# Patient Record
Sex: MALE | Race: WHITE
[De-identification: names, ages, dates, MRNs, and addresses within clinical notes are randomized per-mention and may not be internally consistent; named-entity substitution may affect disease eponyms.]

---

## 2017-01-12 ENCOUNTER — HOSPITAL ENCOUNTER (OUTPATIENT)
Dept: HOSPITAL 45 - C.CTS | Age: 37
Discharge: HOME | End: 2017-01-12
Attending: INTERNAL MEDICINE
Payer: COMMERCIAL

## 2017-01-12 DIAGNOSIS — K76.9: ICD-10-CM

## 2017-01-12 DIAGNOSIS — R10.12: Primary | ICD-10-CM

## 2017-01-12 NOTE — DIAGNOSTIC IMAGING REPORT
CT OF THE ABDOMEN WITH IV AND ORAL CONTRAST



CT DOSE: 688.67 mGycm



CLINICAL HISTORY: Acute left upper quadrant pain.    



TECHNIQUE: Axial images of the abdomen were obtained following intravenous

injection of 94 cc Optiray 320 IV. Oral contrast was administered.



COMPARISON STUDY:  None.



FINDINGS: Visualized portions of the lower chest demonstrate linear and

groundglass opacities suggestive of atelectasis. There is no pneumatosis, free

air or portal venous gas within the abdomen. Liver morphology is normal. Note is

made of a 1.3 cm hypodense segment 7 hepatic lesion shown on axial image 2472.

The size of the spleen is at the upper limits of normal. There is no perisplenic

fluid. There is no splenic lesion. The adrenal glands, kidneys and pancreas are

normal. There is no biliary or pancreatic ductal dilatation. No abdominal

lymphadenopathy or ascites is identified. The caliber and wall thickness of

visualized small and large bowel are normal. Skeletal structures are

unremarkable.



IMPRESSION:  



1. No acute process within the abdomen.



2.  No significant abnormality of the spleen. Size of the spleen at the upper

limits of normal. No perisplenic fluid.



3. Indeterminate 1.3 cm right hepatic lobe lesion. Although indeterminate, this

is likely benign. A follow-up right upper quadrant ultrasound could be obtained.







Electronically signed by:  Rajinder Echavarria M.D.

1/12/2017 3:57 PM



Dictated Date/Time:  1/12/2017 3:50 PM

## 2017-04-27 ENCOUNTER — HOSPITAL ENCOUNTER (OUTPATIENT)
Dept: HOSPITAL 45 - C.ULTR | Age: 37
Discharge: HOME | End: 2017-04-27
Attending: INTERNAL MEDICINE
Payer: COMMERCIAL

## 2017-04-27 DIAGNOSIS — K76.9: Primary | ICD-10-CM

## 2017-04-27 NOTE — DIAGNOSTIC IMAGING REPORT
ABDOMINAL ULTRASOUND, RIGHT UPPER QUADRANT



HISTORY:      Evaluate liver lesion on CT..



COMPARISON:  Abdomen and pelvis CT 1/12/2017.



FINDINGS:



Pancreas: The pancreatic tail is obscured by overlying bowel gas. The remaining

portions of the pancreas are within normal limits.



Liver: Unremarkable.



Gallbladder: No gallbladder wall thickening. No gallstones.



CBD: 4 mm.



Right kidney: No hydronephrosis.



IMPRESSION: 



1. No significant abnormality within the abdomen.

2. Specifically, the right hepatic lobe lesion seen on CT was not identified on

this examination. This was likely obscured by the overlying lung parenchyma

given the close proximity to the diaphragm.







Electronically signed by:  Albert Avendano M.D.

4/27/2017 9:47 AM



Dictated Date/Time:  4/27/2017 9:45 AM

## 2017-06-06 ENCOUNTER — HOSPITAL ENCOUNTER (OUTPATIENT)
Dept: HOSPITAL 45 - C.LABBFT | Age: 37
Discharge: HOME | End: 2017-06-06
Attending: INTERNAL MEDICINE
Payer: COMMERCIAL

## 2017-06-06 DIAGNOSIS — Z00.00: Primary | ICD-10-CM

## 2017-06-06 DIAGNOSIS — R07.9: ICD-10-CM

## 2017-06-06 DIAGNOSIS — K76.9: ICD-10-CM

## 2017-06-06 DIAGNOSIS — R00.2: ICD-10-CM

## 2017-06-06 LAB
ALBUMIN/GLOB SERPL: 1.3 {RATIO} (ref 0.9–2)
ALP SERPL-CCNC: 63 U/L (ref 45–117)
ALT SERPL-CCNC: 28 U/L (ref 12–78)
ANION GAP SERPL CALC-SCNC: 8 MMOL/L (ref 3–11)
AST SERPL-CCNC: 17 U/L (ref 15–37)
BASOPHILS # BLD: 0.02 K/UL (ref 0–0.2)
BASOPHILS NFR BLD: 0.2 %
BUN SERPL-MCNC: 21 MG/DL (ref 7–18)
BUN/CREAT SERPL: 23 (ref 10–20)
CALCIUM SERPL-MCNC: 9 MG/DL (ref 8.5–10.1)
CHLORIDE SERPL-SCNC: 106 MMOL/L (ref 98–107)
CHOLEST/HDLC SERPL: 5.4 {RATIO}
CO2 SERPL-SCNC: 26 MMOL/L (ref 21–32)
COMPLETE: YES
CREAT SERPL-MCNC: 0.92 MG/DL (ref 0.6–1.4)
EOSINOPHIL NFR BLD AUTO: 228 K/UL (ref 130–400)
GLOBULIN SER-MCNC: 3.2 GM/DL (ref 2.5–4)
GLUCOSE SERPL-MCNC: 91 MG/DL (ref 70–99)
GLUCOSE UR QL: 37 MG/DL
HCT VFR BLD CALC: 43.3 % (ref 42–52)
IG%: 0.1 %
IMM GRANULOCYTES NFR BLD AUTO: 25.3 %
KETONES UR QL STRIP: 132 MG/DL
LYMPHOCYTES # BLD: 2.1 K/UL (ref 1.2–3.4)
MAGNESIUM SERPL-MCNC: 2.2 MG/DL (ref 1.8–2.4)
MCH RBC QN AUTO: 31 PG (ref 25–34)
MCHC RBC AUTO-ENTMCNC: 34.2 G/DL (ref 32–36)
MCV RBC AUTO: 90.8 FL (ref 80–100)
MONOCYTES NFR BLD: 10 %
NEUTROPHILS # BLD AUTO: 2.4 %
NEUTROPHILS NFR BLD AUTO: 62 %
NITRITE UR QL STRIP: 157 MG/DL (ref 0–150)
PH UR: 200 MG/DL (ref 0–200)
PMV BLD AUTO: 9.5 FL (ref 7.4–10.4)
POTASSIUM SERPL-SCNC: 4.3 MMOL/L (ref 3.5–5.1)
RBC # BLD AUTO: 4.77 M/UL (ref 4.7–6.1)
SODIUM SERPL-SCNC: 140 MMOL/L (ref 136–145)
TSH SERPL-ACNC: 1.22 UIU/ML (ref 0.3–4.5)
VERY LOW DENSITY LIPOPROT CALC: 31 MG/DL
WBC # BLD AUTO: 8.3 K/UL (ref 4.8–10.8)

## 2017-07-03 ENCOUNTER — HOSPITAL ENCOUNTER (EMERGENCY)
Dept: HOSPITAL 45 - C.EDB | Age: 37
Discharge: HOME | End: 2017-07-03
Payer: COMMERCIAL

## 2017-07-03 VITALS — DIASTOLIC BLOOD PRESSURE: 65 MMHG | HEART RATE: 72 BPM | OXYGEN SATURATION: 94 % | SYSTOLIC BLOOD PRESSURE: 128 MMHG

## 2017-07-03 VITALS
HEIGHT: 70.98 IN | BODY MASS INDEX: 35.19 KG/M2 | BODY MASS INDEX: 35.19 KG/M2 | WEIGHT: 251.33 LBS | WEIGHT: 251.33 LBS | HEIGHT: 70.98 IN

## 2017-07-03 VITALS — TEMPERATURE: 97.88 F

## 2017-07-03 DIAGNOSIS — M51.16: Primary | ICD-10-CM

## 2017-07-03 DIAGNOSIS — I10: ICD-10-CM

## 2017-07-03 DIAGNOSIS — E78.5: ICD-10-CM

## 2017-07-03 DIAGNOSIS — M21.372: ICD-10-CM

## 2017-07-03 NOTE — EMERGENCY ROOM VISIT NOTE
ED Visit Note


First contact with patient:  12:25


CHIEF COMPLAINT:  Low back pain radiating to the legs x 2.5 weeks





HISTORY OF PRESENT ILLNESS: Patient is a generally healthy 46-year-old white 

male who presents emergency department for evaluation of low back pain 

radiating into the legs.  He says symptoms for about 2-1/2 weeks.  They started 

after he was bent over pain limiting sweet corn.  He describes pain primarily 

across his left low back, radiating into his buttocks.  It has been radiating 

into both the right and the left legs, or into the medial thighs, although only 

radiates into one leg at a time.  He states that it initially was the right leg 

and more recently has been the left leg.  The pain has progressively worsened, 

to the point where he has been ambulating with a walker, and reports weakness 

in his legs.  Pain is worse with movement, to particularly with flexion.  He 

has been using over-the-counter anti-inflammatories, as well as a Medrol 

Dosepak and Mobic which were prescribed by his PCP.  He is previously 

established with [facility], his last MRI was in 2013.  He had a 

left lower extremity trauma many years ago and has a chronic foot drop and 

numbness for which he wears a brace, and denies any changes in this.  He denies 

any bowel or bladder incontinence or saddle anesthesias.  No recent direct 

trauma.  No vomiting or abdominal pain.





REVIEW OF SYSTEMS: Review of systems as per HPI.  All other systems reviewed 

were negative.  10 systems reviewed.


 


PMH: Electronic medical records are reviewed and summarized as above/below.  

See Problem List.





SOCIAL HISTORY:  Patient lives at home with his wife and son.  Nonsmoker, 

drinker, socially.





PHYSICAL EXAM: Vital Signs: Reviewed Nurse's notes.  


CONSTITUTIONAL: Patient is a 36-year-old white male who is awake and alert and 

sitting upright on the gurney in moderate distress due to his back pain.  There 

is significant discomfort with position changes.


NECK: No bruits auscultated.  Supple without lymphadenopathy.  No thyromegaly.  

No meningeal signs.  Full active range of motion without discomfort.


CARDIOVASCULAR: Regular rate and rhythm, with normal S1 and S2, no murmur or 

gallop or rub is heard.  No carotid bruits auscultated.  No JVD.  Peripheral 

pulses easily palpable.


RESPIRATORY: Breath sounds equal and clear to auscultation without wheezes, 

rales, or rhonchi heard.   Full and equal chest expansion without accessory 

muscle use or retractions. 


ABDOMEN: Bowel sounds are present.  Abdomen is soft, nontender and nondistended.


INTEGUMENTARY: No lesions or rash, normal skin turgor. 


LYMPH: No lymphadenopathy. 


SPINE: Examination of the patient's back does not demonstrate any ecchymosis, 

abrasions or outward signs of trauma.  No erythema, increased warmth or 

induration.  Patient has midline discomfort to palpation over the low lumbar 

spine, primarily on the left.  There is no pain over the SI joint or the 

sciatic notch.  He has increased pain with range of motion including flexion.  


EXTREMITIES: Leg lengths are symmetrical.  Negative logroll bilaterally.  

Patient has a chronic left foot drop and is unable to dorsiflex or plantar flex 

fully with the left.  Normal strength including dorsi-flexion and plantar 

flexion of the right great toe and ankle.  Normal strength to flexion and 

extension of the knees and flexion of the hips bilaterally.  Positive bilateral 

straight leg raise testing.  Lower extremity DTRs are equal and symmetrical 

bilaterally.  Distal pulses are easily palpable.  Sensation light touch is 

intact over the lower extremities bilaterally.





EMERGENCY DEPARTMENT COURSE: The patient was seen and examined as above.  His 

old MRI was reviewed.  IV lock was initiated and he was medicated with Toradol, 

Decadron, Dilaudid and Zofran for pain.  Lumbar spine MRI was obtained with the 

findings noted below.  The patient did report some nausea when he returned from 

MRI was given an additional Zofran 4 mg IV.  MRI findings were reviewed with 

Dr. Solis, and discussed with the patient and his wife at length.  Treatment 

options were discussed with the patient.  He would prefer to go home, which I 

feel is reasonable at this time, as long as his pain can be managed.  He 

certainly does not have any physical exam findings consistent with acute cord 

compression or cauda equina syndrome at this time.  Dr. Solis reports that his 

office will get in touch with the patient to set up an appointment for later 

this week.  The patient will be discharged home on oxycodone, Zofran and 

penicillin.  He was educated on the worrisome signs or symptoms for which she 

would return to the emergency department.  He was discharged home with his wife 

driving.





LUMBAR SPINE W/O CONTRAST





HISTORY: 36-year-old male presents with radicular low back pain with left leg


weakness for approximately 2 weeks.





COMPARISON: MRI lumbar spine 4/26/2013, lumbar spine radiographs 4/22/2013.





TECHNIQUE: Multiplanar multisequence MRI of the lumbar spine was obtained


without contrast.





FINDINGS: Vertebral body heights are well-maintained without compression


deformity. No focal bone marrow or soft tissue edema. Mild disc desiccation is


present at L4-L5 with mild intervertebral disc space narrowing. Conus medullaris


terminates at the T12-L1 level. Signal within the cord is unremarkable.





T12-L1: Unremarkable on the sagittal imaging alone. 





L1-L2: Annular tear with central disc extrusion is present centrally extending 5


mm caudal to the superior endplate of L2. This measures 5 mm in AP dimension and


causes moderate to severe central canal and severe left lateral recess


narrowing. The bilateral neuroforamen are generally patent.





L2-L3: Right lateral recess/foraminal disc protrusion causes mild central canal,


moderate right lateral recess and mild right neuroforaminal narrowing. The left


neuroforamen is generally patent.





L3-L4: Mild ligamentum flavum redundancy and broad-based posterior disc bulge


without significant central canal or neuroforaminal narrowing.





L4-L5: Annular tear with mild intervertebral disc space narrowing and disc


desiccation. Broad-based posterior disc bulge favoring the left lateral recess


and left neuroforamen is present in conjunction with mild facet arthropathy


causing mild left neuroforaminal stenosis. Central canal and right neuroforamen


are generally patent.





L5-S1: Mild facet arthropathy and ligamentum flavum redundancy causes mild left


neuroforaminal stenosis. The central canal and right neuroforamen are generally


patent.








IMPRESSION: 


1. Annular tear with central disc extrusion extending 5 mm caudally at L1-L2


causes moderate to severe central canal and severe left lateral recess


narrowing. This likely accounts for the patient's reported left lower extremity


symptomatology.





2. At L2-L3 there is a right lateral recess/foraminal disc protrusion which


causes mild central canal, moderate right lateral recess and mild right


neuroforaminal narrowing.





3. Mild intervertebral disc space narrowing with annular tear at L4-L5.


Problem List


Medical Problems:


(1) Chest pain


Status: Resolved  





(2) Chest pain


Status: Resolved  





(3) Hyperlipidemia Nec/Nos


Status: Chronic  





(4) Hypertension


Status: Chronic  





(5) Lumbosacral Neuritis Nos


Status: Chronic  











Current/Historical Medications


Scheduled


Ibuprofen (Advil), 600 MG PO DAILY


Lansoprazole (Prevacid), 30 MG PO DAILY


Meloxicam (Meloxicam), 15 MG PO DAILY


Prednisone (Prednisone), 0 PO DAILY





Scheduled PRN


Ondasetron Odt (Zofran Odt), 4 MG SL Q6H PRN for Nausea or Vomiting


Oxycodone Ir (Roxicodone Ir), 1-2 TAB PO Q4H PRN for Severe Pain





Allergies


Uncoded Allergies:  


     N (Allergy, Unknown, 2/13/03)


     NKDA (Allergy, Unknown, 2/13/03)





Vital Signs











  Date Time  Temp Pulse Resp B/P (MAP) Pulse Ox O2 Delivery O2 Flow Rate FiO2


 


7/3/17 13:44  65 18 120/61 97 Room Air  


 


7/3/17 11:51 36.6 77 18  96 Room Air  











Medications Administered











 Medications


  (Trade)  Dose


 Ordered  Sig/Cristina


 Route  Start Time


 Stop Time Status Last Admin


Dose Admin


 


 Ketorolac


 Tromethamine


  (Toradol Inj)  30 mg  NOW  STAT


 IV  7/3/17 12:54


 7/3/17 12:56 DC 7/3/17 12:54


30 MG


 


 Dexamethasone


 Sodium Phosphate


  (Decadron Inj)  10 mg  NOW  ONCE


 IV  7/3/17 13:00


 7/3/17 13:01 DC 7/3/17 13:00


10 MG


 


 Hydromorphone HCl


  (Dilaudid Inj)  1 mg  NOW  STAT


 IV  7/3/17 12:54


 7/3/17 12:56 DC 7/3/17 12:54


1 MG


 


 Ondansetron HCl


  (Zofran Inj)  4 mg  NOW  STAT


 IV  7/3/17 12:54


 7/3/17 12:56 DC 7/3/17 12:54


4 MG


 


 Ondansetron HCl


  (Zofran Inj)  4 mg  NOW  STAT


 IV  7/3/17 15:54


 7/3/17 15:55 DC 7/3/17 16:31


4 MG











Departure Information


Impression





 Primary Impression:  


 Lumbar disc herniation with radiculopathy





Prescriptions





Oxycodone Ir (Roxicodone Ir) 5 Mg Tab


1-2 TAB PO Q4H Y for Severe Pain, #25 TAB


   For Initial Treatment


   Prov: Queenie James,PA         7/3/17 


Prednisone (Prednisone) 20 Mg Tab


0 PO DAILY, #18 TAB


   3 DAILY FOR 3 DAYS, THEN 2 DAILY FOR 3 DAYS, THEN 1 DAILY FOR 3 DAYS.


   Prov: Queenie James PA         7/3/17 


Ondasetron Odt (ZOFRAN ODT) 4 Mg Tab


4 MG SL Q6H Y for Nausea or Vomiting, #20 TAB


   Prov: Queenie James PA         7/3/17





Referrals


Francis Joyner M.D. (PCP)





Patient Instructions


My [facility]





Additional Instructions





DO NOT drive, drink alcohol, operate machinery, or perform dangerous activities 

today.  You were given medications in the ER that can affect your ability to 

safely function or operate a vehicle.





Prednisone 20mg:  Once daily as instructed until the prescription is finished.  

It is best to take this earlier in the day as some patients note occasional 

difficulty falling asleep when taken in the late evening.





Oxycodone (OxyIR) 5mg: Take 1-2 pills every four hours for breakthrough pain.  

Avoid alcohol, operating machinery or dangerous equipment, working on ladders 

or roofs, DRIVING, or situations where being under the influence may be 

dangerous.  It is recommended to use an over-the-counter stool softener such as 

Colace, 100mg twice daily while taking this medication to avoid constipation. 





Stop Ibuprofen/Mobic, etc.





Zofran(odansetron) tablets 4mg:  Take one and allow it to dissolve in your 

mouth every four to six hours as needed for nausea or vomiting. 





Acetaminophen(Tylenol) may be used for fever or pain.  Use 1000mg every six 

hours as needed.  Avoid using more than 3000mg in a 24 hour period.  This 

medication can be taken if you need to drive, work, or perform activities which 

may be dangerous when taking narcotic pain medication.





Rest and avoid heavy lifting until your symptoms resolve and then gradually 

return to full activity.  A good rule of thumb is if it hurts your back to 

perform a certain activity, then it should be avoided until you are healthy 

again.  





A heating pad, warm compresses, or a hot shower may help with tight muscles and 

can be done several times a day as needed.  





Continue current medications.





Return to the ER immediately for any numbness, tingling, severe pain, loss of 

control of your bowels or bladder, inability to walk, or as needed.





Follow up with [facility] as arranged.  They will contact you 

with an appointment.

## 2017-07-03 NOTE — DIAGNOSTIC IMAGING REPORT
LUMBAR SPINE W/O CONTRAST



HISTORY: 36-year-old male presents with radicular low back pain with left leg

weakness for approximately 2 weeks.



COMPARISON: MRI lumbar spine 4/26/2013, lumbar spine radiographs 4/22/2013.



TECHNIQUE: Multiplanar multisequence MRI of the lumbar spine was obtained

without contrast.



FINDINGS: Vertebral body heights are well-maintained without compression

deformity. No focal bone marrow or soft tissue edema. Mild disc desiccation is

present at L4-L5 with mild intervertebral disc space narrowing. Conus medullaris

terminates at the T12-L1 level. Signal within the cord is unremarkable.



T12-L1: Unremarkable on the sagittal imaging alone. 



L1-L2: Annular tear with central disc extrusion is present centrally extending 5

mm caudal to the superior endplate of L2. This measures 5 mm in AP dimension and

causes moderate to severe central canal and severe left lateral recess

narrowing. The bilateral neuroforamen are generally patent.



L2-L3: Right lateral recess/foraminal disc protrusion causes mild central canal,

moderate right lateral recess and mild right neuroforaminal narrowing. The left

neuroforamen is generally patent.



L3-L4: Mild ligamentum flavum redundancy and broad-based posterior disc bulge

without significant central canal or neuroforaminal narrowing.



L4-L5: Annular tear with mild intervertebral disc space narrowing and disc

desiccation. Broad-based posterior disc bulge favoring the left lateral recess

and left neuroforamen is present in conjunction with mild facet arthropathy

causing mild left neuroforaminal stenosis. Central canal and right neuroforamen

are generally patent.



L5-S1: Mild facet arthropathy and ligamentum flavum redundancy causes mild left

neuroforaminal stenosis. The central canal and right neuroforamen are generally

patent.





IMPRESSION: 

1. Annular tear with central disc extrusion extending 5 mm caudally at L1-L2

causes moderate to severe central canal and severe left lateral recess

narrowing. This likely accounts for the patient's reported left lower extremity

symptomatology.



2. At L2-L3 there is a right lateral recess/foraminal disc protrusion which

causes mild central canal, moderate right lateral recess and mild right

neuroforaminal narrowing.



3. Mild intervertebral disc space narrowing with annular tear at L4-L5.







Electronically signed by:  Karl Kramer

7/3/2017 2:49 PM



Dictated Date/Time:  7/3/2017 2:30 PM

## 2017-07-06 ENCOUNTER — HOSPITAL ENCOUNTER (OUTPATIENT)
Dept: HOSPITAL 45 - X.SURG | Age: 37
Discharge: HOME | End: 2017-07-06
Attending: PHYSICAL MEDICINE & REHABILITATION
Payer: COMMERCIAL

## 2017-07-06 VITALS
HEART RATE: 74 BPM | TEMPERATURE: 97.88 F | DIASTOLIC BLOOD PRESSURE: 94 MMHG | OXYGEN SATURATION: 99 % | SYSTOLIC BLOOD PRESSURE: 132 MMHG

## 2017-07-06 VITALS
BODY MASS INDEX: 35.07 KG/M2 | WEIGHT: 250.53 LBS | WEIGHT: 250.53 LBS | HEIGHT: 70.98 IN | HEIGHT: 70.98 IN | BODY MASS INDEX: 35.07 KG/M2

## 2017-07-06 DIAGNOSIS — M51.26: Primary | ICD-10-CM

## 2017-07-06 DIAGNOSIS — Z82.49: ICD-10-CM

## 2017-07-06 DIAGNOSIS — M54.10: ICD-10-CM

## 2017-07-06 NOTE — DISCHARGE INSTRUCTIONS
Discharge Instructions


Date of Service


Jul 6, 2017.





Visit


Reason for Visit:  Low Back Pain





Discharge


Discharge Diagnosis / Problem:  low back pain





Discharge Goals


Goal(s):  Decrease discomfort, Improve function





Activity Recommendations


Activity Limitations:  resume your previous activity





Anesthesia


.





Post Anesthesia Instructions:





If you have had General Anesthesia or IV Sedation:





*  Do not drive today.


*  Resume driving when surgeon permits.


*  Do not make important decisions or sign legal documents today.


*  Call surgeon for:





   1.  Temperature elevations greater than 101 degrees F.


   2.  Uncontrollable pain.


   3.  Excessive bleeding.


   4.  Persistent nausea and vomiting.


   5.  Medication intolerance (nausea, vomiting or rash).





*  For nausea and vomiting use only clear liquids such as: tea, soda, bouillon 

until nausea subsides, then gradually increase diet as tolerated.





*  If you have any concerns or questions, call your surgeon's office.  If 

physician is unavailable and it is an emergency, call 911 or go to the nearest 

emergency room.





.





Diet Recommendations


Recommended Home Diet:  resume previous diet





Procedures


Procedures Performed:  


LUMBAR EPIDURAL STEROID INJECTION.





Pending Studies


Studies pending at discharge:  no





Medical Emergencies








.


Who to Call and When:





Medical Emergencies:  If at any time you feel your situation is an emergency, 

please call 911 immediately.





.





Non-Emergent Contact


Non-Emergency issues call your:  Specialist





.


.








"Provider Documentation" section prepared by Lasha Yanes.








.

## 2017-07-13 NOTE — [facility] OPERATIVE REPORT
Operative Report


Date of Service


Jul 6, 2017.





Operative Report


DATE OF SURGERY:  07/06/17.


 


DATE OF OPERATION:  07/06/2017


 


PREOPERATIVE DIAGNOSIS:  L1-2 herniated nucleus pulposus with left


lower extremity radiculopathy.


 


POSTOPERATIVE DIAGNOSIS:  Same.


 


PROCEDURE:  Left paramedian L1-2 intralaminar epidural steroid injection


under fluoroscopic guidance.


 


SURGEON:  Dr. Lasha Yanes.  


 


INDICATIONS:  The patient is a 36-year-old white male who presents today for an 

epidural to


provide him with relief.


 


PHYSICAL EXAMINATION:


GENERAL:  Pleasant male seated comfortably in no apparent distress.


MUSCULOSKELETAL EXAMINATION:  Lumbar paraspinal muscles were palpated.  They


were nontender.  He had no sensitivity of the sciatic notch.  He had normal


lower extremity strength.  Negative seated straight leg raises and had no


difficulty with hip adduction.


 


CONSENT:  Verbal and written consent was obtained from the patient.  Risks


and benefits were reviewed.  Risks include but are not limited to epidural


abscess, epidural hematoma, allergic reaction, dural puncture.  The patient


wishes to proceed.


 


PROCEDURE:  The patient was taken back to the special procedures room of the


[facility] where he was maintained in a prone position. 


Backside was cleansed with Betadine x3 and a dry sterile dressing was


applied.  Fluoroscope was used to identify the L1-2 intralaminar space and


overlying skin on the right side was anesthetized with 4 mL of lidocaine 1%


with a 25 gauge 1.5-inch needle.  A 22-gauge 3-1/2 inch Tuohy needle was then


directed down towards the intralaminar space.  It was advanced under lateral


fluoroscopic guidance.  Loss of resistance was noted and Isovue-300 contrast 1 

mL was injected in which demonstrated epidural uptake


pattern which was confirmed with both AP and lateral views.  He then


underwent injection after negative aspiration of 40 mg of Depo-Medrol and 4


mL of preservative free sodium chloride.  Injection was well tolerated.


 


DISPOSITION:


1.  The patient is taken out into the discharge recovery area where he will


be discharged home once discharge criteria have been met.


2.  Follow up in the [facility] office in 2-4 weeks.


 


 


I attest to the content of the Intraoperative Record and any orders documented 

therein. Any exceptions are noted below.


I attest to the content of the Intraoperative Record and any orders documented 

therein.  Any exceptions are noted below.

## 2017-09-19 ENCOUNTER — HOSPITAL ENCOUNTER (OUTPATIENT)
Dept: HOSPITAL 45 - C.CTS | Age: 37
Discharge: HOME | End: 2017-09-19
Attending: INTERNAL MEDICINE
Payer: COMMERCIAL

## 2017-09-19 DIAGNOSIS — K76.9: Primary | ICD-10-CM

## 2017-09-19 NOTE — DIAGNOSTIC IMAGING REPORT
ABD WITH IV CONTRAST ONLY (CT)



CLINICAL HISTORY: 37 years-old Male presenting with liver lesions. 



TECHNIQUE: Multidetector CT of the abdomen was performed after the

administration of intravenous contrast. IV contrast: 93 mL of Optiray 320. A

dose lowering technique was used consistent with the principles of ALARA (as low

as reasonably achievable). 



COMPARISON: CT from 1/12/2017 and ultrasound from 4/27/2017.



CT DOSE (mGy.cm): The estimated cumulative dose is 529.05 mGy.cm.



FINDINGS:



 topogram: Unremarkable.



Lung bases: Lung bases clear. No pericardial or pleural effusion.



Liver: Normal morphology. The previously noted indeterminate liver lesion in

segment 7 is not identified on the current examination definitively. This could

be secondary to partial opacification of the lesion. Patent hepatic vasculature.



Biliary: No intrahepatic or extrahepatic biliary ductal dilatation. Normal

gallbladder.



Pancreas: Normal.



Spleen: Normal.



Adrenal glands: Normal.



Kidneys and ureters: Normal. No hydronephrosis.



Gastrointestinal tract: Normal. No bowel obstruction.



Peritoneal cavity: No free fluid or intraperitoneal gas.



Vasculature: Aorta and IVC patent and normal in caliber.



Lymph nodes: Numerous small mesenteric lymph nodes noted.



Abdominal wall: Normal.



Musculoskeletal: Normal.



IMPRESSION:

1.  Nonvisualization of the previously noted small liver lesion in the right

hepatic lobe. This could suggest partial opacification of the lesion. Given the

lack of progression since January, this is suggestive of a benign lesion,

possibly hemangioma. In the absence of known malignancy or hepatocellular risk

factors, no further follow-up is recommended.











Electronically signed by:  Fuad Covarrubias M.D.

9/19/2017 7:44 AM



Dictated Date/Time:  9/19/2017 7:39 AM

## 2017-11-16 ENCOUNTER — HOSPITAL ENCOUNTER (OUTPATIENT)
Dept: HOSPITAL 45 - X.SURG | Age: 37
Discharge: HOME | End: 2017-11-16
Attending: PHYSICAL MEDICINE & REHABILITATION
Payer: COMMERCIAL

## 2017-11-16 VITALS
HEIGHT: 70.98 IN | BODY MASS INDEX: 35.07 KG/M2 | WEIGHT: 250.53 LBS | HEIGHT: 70.98 IN | BODY MASS INDEX: 35.07 KG/M2 | WEIGHT: 250.53 LBS

## 2017-11-16 VITALS — OXYGEN SATURATION: 94 % | HEART RATE: 90 BPM | DIASTOLIC BLOOD PRESSURE: 85 MMHG | SYSTOLIC BLOOD PRESSURE: 133 MMHG

## 2017-11-16 VITALS — TEMPERATURE: 99.5 F

## 2017-11-16 DIAGNOSIS — M79.604: ICD-10-CM

## 2017-11-16 DIAGNOSIS — M51.16: Primary | ICD-10-CM

## 2017-11-16 NOTE — OPERATIVE REPORT
DATE OF OPERATION:  11/16/2017

 

PREOPERATIVE DIAGNOSIS:  L1-L2 HNP  with right lower extremity radiculopathy.

 

POSTOPERATIVE DIAGNOSIS:  Same.

 

PROCEDURE:  Right paramedian L1-L2 intralaminar epidural steroid injection

under fluoroscopic guidance.

 

SURGEON:  Dr. Lasha Yanes.  

 

INDICATIONS:  The patient is a 37-year-old white male who underwent an

epidural injection for an L1-2  herniation in July.  He did well up until a

short period ago when he was digging a hole to plant a tree.   He had the

sudden onset of recurrence of the pain that is debilitating at times.  He 

was seen recently by chiropractor.  He reports the pain got worse and is now

bilateral but still predominantly on the right.  He presents today for an

injection to provide him with relief.

 

PHYSICAL EXAMINATION:  He is very uncomfortable lying prone.  He has

limitations with any type of motion of the back and has sensitivity into the

right flank area.

 

CONSENT:  Verbal and written consent was obtained from the patient.  Risks

and benefits were reviewed.  Risks include epidural abscess, epidural

hematoma, allergic reaction, dural puncture.  The patient wishes to proceed.

 

PROCEDURE:  The patient was taken back to the special procedures room of the

[facility] where he was maintained in a prone position. 

Backside was cleansed with Betadine x3 and a dry sterile dressing was

applied.  Fluoroscope was used to identify the L1-L2 intralaminar space and

overlying skin on the right side was anesthetized with 4 mL of lidocaine 1%

with a 25 gauge 1.5-inch needle.  A 22-gauge 3-1/2 inch Tuohy needle was then

directed down towards the intralaminar space.  Loss of resistance was noted

at a depth of just over 4 cm.  Isovue-300 contrast  0.5 mL was injected in

which demonstrated epidural uptake pattern which was more superior and at the

level.  He then underwent injection after negative aspiration of 40 mg of

Depo-Medrol, 4 mL of preservative free sodium chloride.  It was uncomfortable

and reproduced an increase in radicular sensation which was transient.

 

DISPOSITION:

1. The patient is taken out into the discharge recovery area where he will be

discharged home once discharge criteria have been met.

2. Follow up in the [facility] office in 2-4 weeks.

 

 

I attest to the content of the Intraoperative Record and any orders documented therein. Any exception
s are noted below.

## 2017-11-16 NOTE — DISCHARGE INSTRUCTIONS
Discharge Instructions


Date of Service


Nov 16, 2017.





Visit


Reason for Visit:  Lumbar Displacement





Discharge


Discharge Diagnosis / Problem:  right leg pain





Discharge Goals


Goal(s):  Decrease discomfort, Improve function





Activity Recommendations


Activity Limitations:  resume your previous activity





Anesthesia


.





Post Anesthesia Instructions:





If you have had General Anesthesia or IV Sedation:





*  Do not drive today.


*  Resume driving when surgeon permits.


*  Do not make important decisions or sign legal documents today.


*  Call surgeon for:





   1.  Temperature elevations greater than 101 degrees F.


   2.  Uncontrollable pain.


   3.  Excessive bleeding.


   4.  Persistent nausea and vomiting.


   5.  Medication intolerance (nausea, vomiting or rash).





*  For nausea and vomiting use only clear liquids such as: tea, soda, bouillon 

until nausea subsides, then gradually increase diet as tolerated.





*  If you have any concerns or questions, call your surgeon's office.  If 

physician is unavailable and it is an emergency, call 911 or go to the nearest 

emergency room.





.





Diet Recommendations


Recommended Home Diet:  resume previous diet





Procedures


Procedures Performed:  


LUMBAR EPIDURAL STEROID INJECTION





Pending Studies


Studies pending at discharge:  no





Medical Emergencies








.


Who to Call and When:





Medical Emergencies:  If at any time you feel your situation is an emergency, 

please call 911 immediately.





.





Non-Emergent Contact


Non-Emergency issues call your:  Specialist





.


.








"Provider Documentation" section prepared by Lasha Yanes.








.

## 2018-03-08 ENCOUNTER — HOSPITAL ENCOUNTER (OUTPATIENT)
Dept: HOSPITAL 45 - C.CTS | Age: 38
Discharge: HOME | End: 2018-03-08
Attending: NURSE PRACTITIONER
Payer: COMMERCIAL

## 2018-03-08 DIAGNOSIS — R53.83: ICD-10-CM

## 2018-03-08 DIAGNOSIS — M54.2: ICD-10-CM
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
## 2018-03-08 DIAGNOSIS — R55: Primary | ICD-10-CM

## 2018-03-08 DIAGNOSIS — R73.9: ICD-10-CM

## 2018-03-08 DIAGNOSIS — S49.91XA: ICD-10-CM

## 2018-03-08 LAB
ALBUMIN SERPL-MCNC: 4.1 GM/DL (ref 3.4–5)
ALP SERPL-CCNC: 67 U/L (ref 45–117)
ALT SERPL-CCNC: 33 U/L (ref 12–78)
AST SERPL-CCNC: 14 U/L (ref 15–37)
BASOPHILS # BLD: 0.01 K/UL (ref 0–0.2)
BASOPHILS NFR BLD: 0.1 %
BUN SERPL-MCNC: 15 MG/DL (ref 7–18)
CALCIUM SERPL-MCNC: 9.1 MG/DL (ref 8.5–10.1)
CO2 SERPL-SCNC: 29 MMOL/L (ref 21–32)
CREAT SERPL-MCNC: 1.06 MG/DL (ref 0.6–1.4)
EOS ABS #: 0.11 K/UL (ref 0–0.5)
EOSINOPHIL NFR BLD AUTO: 245 K/UL (ref 130–400)
GLUCOSE SERPL-MCNC: 106 MG/DL (ref 70–99)
HBA1C MFR BLD: 5 % (ref 4.5–5.6)
HCT VFR BLD CALC: 43.5 % (ref 42–52)
HGB BLD-MCNC: 15 G/DL (ref 14–18)
IG#: 0.01 K/UL (ref 0–0.02)
IMM GRANULOCYTES NFR BLD AUTO: 18.1 %
LYMPHOCYTES # BLD: 1.65 K/UL (ref 1.2–3.4)
MCH RBC QN AUTO: 31.4 PG (ref 25–34)
MCHC RBC AUTO-ENTMCNC: 34.5 G/DL (ref 32–36)
MCV RBC AUTO: 91.2 FL (ref 80–100)
MONO ABS #: 0.71 K/UL (ref 0.11–0.59)
MONOCYTES NFR BLD: 7.8 %
NEUT ABS #: 6.62 K/UL (ref 1.4–6.5)
NEUTROPHILS # BLD AUTO: 1.2 %
NEUTROPHILS NFR BLD AUTO: 72.7 %
PMV BLD AUTO: 9.5 FL (ref 7.4–10.4)
POTASSIUM SERPL-SCNC: 3.9 MMOL/L (ref 3.5–5.1)
PROT SERPL-MCNC: 7.4 GM/DL (ref 6.4–8.2)
RED CELL DISTRIBUTION WIDTH CV: 12.9 % (ref 11.5–14.5)
RED CELL DISTRIBUTION WIDTH SD: 43.2 FL (ref 36.4–46.3)
SODIUM SERPL-SCNC: 139 MMOL/L (ref 136–145)
WBC # BLD AUTO: 9.11 K/UL (ref 4.8–10.8)

## 2018-03-08 NOTE — DIAGNOSTIC IMAGING REPORT
CERVICAL SPINE 2 OR 3 VIEWS



CLINICAL HISTORY: W01.0XXA Fall from slip, trip, or stumble M54.2 Neck

[id number]    



COMPARISON STUDY:  No previous studies for comparison.



FINDINGS: The prevertebral soft tissues are normal. No fractures or subluxations

are visualized. There are mild multilevel degenerative changes.



IMPRESSION:  No fractures or subluxations identified on this AP and lateral

study 









Electronically signed by:  Obie Grider M.D.

3/8/2018 12:50 PM



Dictated Date/Time:  3/8/2018 12:49 PM

## 2018-03-08 NOTE — DIAGNOSTIC IMAGING REPORT
HEAD CT NONCONTRAST



CT DOSE: 537.48 mGy.cm



HISTORY:      R55 Syncope and collapse W01.0XXA Fall from slip, trip, or stumble



TECHNIQUE: Multiaxial CT images of the head were performed without the use of

intravenous contrast. Automated exposure control was utilized for this study.  A

dose lowering technique was utilized adhering to the principles of ALARA.



Comparison: Head CT 2/16/2015.



Findings: The paranasal sinuses and mastoid air cells are clear. The calvarium

and skull base are intact. The ventricles and sulci are within normal limits.

There is no mass, hematoma, midline shift, or acute infarct.



Impression:

No acute intracranial abnormality. 







Electronically signed by:  Albert Avendano M.D.

3/8/2018 12:32 PM



Dictated Date/Time:  3/8/2018 12:22 PM

## 2018-03-08 NOTE — DIAGNOSTIC IMAGING REPORT
RIGHT SHOULDER 3 VIEWS



HISTORY: Right shoulder pain.  W01.0XXA Fall from slip, trip, or [id number]

Neck [id number]



COMPARISON: None.



FINDINGS: There is no fracture or dislocation. Soft tissues are unremarkable.

The right clavicle is intact.



IMPRESSION:  

No fracture or dislocation within the right shoulder.







Electronically signed by:  Albert Avendaon M.D.

3/8/2018 12:50 PM



Dictated Date/Time:  3/8/2018 12:49 PM

## 2018-08-09 ENCOUNTER — HOSPITAL ENCOUNTER (OUTPATIENT)
Dept: HOSPITAL 45 - C.LABBFT | Age: 38
Discharge: HOME | End: 2018-08-09
Attending: NURSE PRACTITIONER
Payer: COMMERCIAL

## 2018-08-09 DIAGNOSIS — Z13.220: Primary | ICD-10-CM

## 2018-08-09 DIAGNOSIS — Z13.228: ICD-10-CM

## 2018-08-09 LAB
KETONES UR QL STRIP: 132 MG/DL
PH UR: 198 MG/DL (ref 0–200)